# Patient Record
Sex: FEMALE | Race: WHITE | NOT HISPANIC OR LATINO | URBAN - METROPOLITAN AREA
[De-identification: names, ages, dates, MRNs, and addresses within clinical notes are randomized per-mention and may not be internally consistent; named-entity substitution may affect disease eponyms.]

---

## 2023-01-26 ENCOUNTER — APPOINTMENT (OUTPATIENT)
Dept: URBAN - METROPOLITAN AREA CLINIC 208 | Age: 38
Setting detail: DERMATOLOGY
End: 2023-01-26

## 2023-01-26 DIAGNOSIS — L20.84 INTRINSIC (ALLERGIC) ECZEMA: ICD-10-CM

## 2023-01-26 PROCEDURE — OTHER MIPS QUALITY: OTHER

## 2023-01-26 PROCEDURE — OTHER PRESCRIPTION: OTHER

## 2023-01-26 PROCEDURE — OTHER PRESCRIPTION MEDICATION MANAGEMENT: OTHER

## 2023-01-26 PROCEDURE — OTHER COUNSELING: OTHER

## 2023-01-26 PROCEDURE — 99204 OFFICE O/P NEW MOD 45 MIN: CPT

## 2023-01-26 RX ORDER — PIMECROLIMUS 10 MG/G
CREAM TOPICAL
Qty: 60 | Refills: 5 | Status: ERX | COMMUNITY
Start: 2023-01-26

## 2023-01-26 RX ORDER — CLOBETASOL PROPIONATE 0.5 MG/G
CREAM TOPICAL
Qty: 60 | Refills: 3 | Status: ERX | COMMUNITY
Start: 2023-01-26

## 2023-01-26 ASSESSMENT — LOCATION SIMPLE DESCRIPTION DERM
LOCATION SIMPLE: RIGHT INDEX FINGER
LOCATION SIMPLE: RIGHT MIDDLE FINGER
LOCATION SIMPLE: RIGHT RING FINGER
LOCATION SIMPLE: RIGHT INDEX FINGERNAIL
LOCATION SIMPLE: RIGHT MIDDLE FINGERNAIL

## 2023-01-26 ASSESSMENT — LOCATION DETAILED DESCRIPTION DERM
LOCATION DETAILED: RIGHT DISTAL DORSAL RING FINGER
LOCATION DETAILED: PERIUNGUAL SKIN RIGHT MIDDLE FINGER
LOCATION DETAILED: RIGHT INDEX FINGERNAIL
LOCATION DETAILED: RIGHT MIDDLE FINGER LUNULA
LOCATION DETAILED: PERIUNGUAL SKIN RIGHT INDEX FINGER

## 2023-01-26 ASSESSMENT — LOCATION ZONE DERM
LOCATION ZONE: FINGER
LOCATION ZONE: FINGERNAIL

## 2023-01-26 NOTE — PROCEDURE: PRESCRIPTION MEDICATION MANAGEMENT
Detail Level: Zone
Render In Strict Bullet Format?: Yes
Plan: Follow up in 1 month
Initiate Treatment: clobetasol 0.05 % topical cream: Apply a small amount to affected areas twice a day up to 2 weeks on 2 weeks off, repeat for flares. Avoid face, groin, and underarms.\\n\\nElidel 1 % topical cream: Apply to affected areas twice a day during the 2wks off Clobetasol \\n\\nCeraVe therapeutic hand cream

## 2023-01-26 NOTE — HPI: DRY SKIN
How Severe Is Your Dry Skin?: mild
Is This A New Presentation Or A Follow-Up?: Dry Skin
Additional History: Patient is here for dry, flaky, irritated skin around the nail bed of her middle right finger primarily but is occasionally on the 2nd and 3rd fingers. Patient states that this has been going on for about a year and she recently got a prescription from her primary doctor named Lidex that she says helps but only if she continuously apply it to the areas.

## 2023-01-26 NOTE — PROCEDURE: COUNSELING
Moisturizer Recommendations: CeraVe moisturizing lotion or cream \\nCetaphil moisturizing lotion or cream \\nVanicream Cream \\nAquaphor or Vaseline in winter months
Detail Level: Simple
Antihistamine Recommendations: Antihistamines Recommendations: \\nAllegra 180mg in the morning \\nZyrtec 10mg at night
Cleanser Recommendations: CeraVe Cleanser \\nCetaphil Cleanser \\nVanicream Cleanser \\nDove sensitive skin body wash or bar
Patient Specific Counseling (Will Not Stick From Patient To Patient): Recommend wearing gloves when washing dishes or cleaning. \\nAvoid hot water - use cold water and cereave/cetaphil soap to wash hands.